# Patient Record
Sex: MALE
[De-identification: names, ages, dates, MRNs, and addresses within clinical notes are randomized per-mention and may not be internally consistent; named-entity substitution may affect disease eponyms.]

---

## 2023-01-29 ENCOUNTER — NURSE TRIAGE (OUTPATIENT)
Dept: OTHER | Facility: CLINIC | Age: 43
End: 2023-01-29

## 2023-01-29 NOTE — TELEPHONE ENCOUNTER
Location of patient: [place]    Patient is calling with billing questions that this writer is unable to answer. Advised him to call Customer Care in the morning when they open at 0730. No further questions at this time. This triage is a result of a call to [address] [place] [address]. Please do not respond to the triage nurse through this encounter. Any subsequent communication should be directly with the patient. Reason for Disposition   General information question, no triage required and triager able to answer question    Protocols used:  Information Only Call - No Triage-ADULT-

## 2023-01-29 NOTE — TELEPHONE ENCOUNTER
Location of patient: [place]    Subjective: Caller states \"I need to find out if my jaw is dislocated \"     Current Symptoms: right sided jaw pain that has been worsening since Tuesday. Intermittent headache- constant now and mostly on sides and in middle. Worse on the right side. No swelling. Onset: 6 months ago; worsening since Tuesday. Associated Symptoms: reduced activity, reduced appetite, increased wakefulness    Pain Severity: 5-10/10; sharp, pressure; constant, waxing and waning    Temperature: none     What has been tried: tylenol, muscle relaxer, antiinflammatory    LMP: NA Pregnant: NA    Recommended disposition: See HCP within 4 Hours (or PCP triage),  [facility] first then ED if [facility] are unable to help    Care advice provided, patient verbalizes understanding; denies any other questions or concerns; instructed to call back for any new or worsening symptoms. [facility] if [facility] can [facility], [facility]. IF neither able to help him, will go to the ED. This triage is a result of a call to [address] [place] [address]. Please do not respond to the triage nurse through this encounter. Any subsequent communication should be directly with the patient. Reason for Disposition   [1] SEVERE pain (e.g., excruciating) AND [2] not improved after 2 hours of pain medicine    Protocols used:  Face Pain-ADULT-

## 2023-01-30 ENCOUNTER — NURSE TRIAGE (OUTPATIENT)
Dept: OTHER | Facility: CLINIC | Age: 43
End: 2023-01-30

## 2023-01-30 NOTE — TELEPHONE ENCOUNTER
Location of [place]    Pt calling to report that he was triaged yesterday and was recommended to be seen within 4 hours for his symptoms. He called multiple urgent care centers who reported they would not be able to assist with the symptoms so he had to go to the emergency department for further evaluation. Pt calling to make note that he attempted to be seen at an urgent care center but none available for his symptoms so had to go to an ED      This triage is a result of a call to [address] [place] [address] of [place] [address]. Please do not respond to the triage nurse through this encounter. Any subsequent communication should be directly with the patient. Reason for Disposition   [1] Follow-up call to recent contact AND [2] information only call, no triage required    Protocols used:  Information Only Call - No Triage-ADULT-

## 2023-01-31 ENCOUNTER — NURSE TRIAGE (OUTPATIENT)
Dept: OTHER | Facility: CLINIC | Age: 43
End: 2023-01-31

## 2023-01-31 NOTE — TELEPHONE ENCOUNTER
Location of patient: [place]    Subjective: Caller states \"called on Sunday about jaw pain and ended up in the ED\"     Current Symptoms: He also called 4 different C to see if they could see him. They could not treat his issue and ended up in ED. He does not want to have a high bill due to going ot ED. [facility]-did not have xray capability  [facility]- could not treat his issue  [facility]-minor illnesses, could not treat  [facility]-only minor illnesses. Care advice provided, patient verbalizes understanding; denies any other questions or concerns; instructed to call back for any new or worsening symptoms. Call transferred to [facility] for further assistance. This triage is a result of a call to [facility]. Please do not respond to the triage nurse through this encounter. Any subsequent communication should be directly with the patient. Reason for Disposition   Health Information question, no triage required and triager able to answer question    Protocols used:  Information Only Call - No Triage-ADULT-

## 2023-08-19 ENCOUNTER — NURSE TRIAGE (OUTPATIENT)
Dept: OTHER | Facility: CLINIC | Age: 43
End: 2023-08-19

## 2023-08-19 NOTE — TELEPHONE ENCOUNTER
Location of patient: [place]    Current Symptoms: Pt has a right calf injury. He has been applying an ice pack twice daily for the last couple of weeks. He last applied the ice pack this morning for about 30 minutes. Today, he noticed red and brown \"waxy\" spots on his calf. He is concerned for frostbite. Onset: Today    Associated Symptoms: NA    Pain Severity: Denies pain    Temperature: NA    What has been tried: Colloidal Silver    LMP: NA Pregnant: NA    Recommended disposition: [facility] advice provided, patient verbalizes understanding; denies any other questions or concerns; instructed to call back for any new or worsening symptoms. This triage is a result of a call to [address] [place] [address]. Please do not respond to the triage nurse through this encounter. Any subsequent communication should be directly with the patient.     Reason for Disposition   Mild frostbite or frostnip symptoms    Protocols used: Frostbite-ADULT-